# Patient Record
Sex: FEMALE | URBAN - METROPOLITAN AREA
[De-identification: names, ages, dates, MRNs, and addresses within clinical notes are randomized per-mention and may not be internally consistent; named-entity substitution may affect disease eponyms.]

---

## 2024-08-14 ENCOUNTER — APPOINTMENT (OUTPATIENT)
Dept: URBAN - METROPOLITAN AREA SURGERY 22 | Age: 44
Setting detail: DERMATOLOGY
End: 2024-08-14

## 2024-08-14 DIAGNOSIS — Z71.89 OTHER SPECIFIED COUNSELING: ICD-10-CM

## 2024-08-14 DIAGNOSIS — L72.8 OTHER FOLLICULAR CYSTS OF THE SKIN AND SUBCUTANEOUS TISSUE: ICD-10-CM

## 2024-08-14 PROCEDURE — OTHER MIPS QUALITY: OTHER

## 2024-08-14 PROCEDURE — OTHER CONSULTATION EXCISION: OTHER

## 2024-08-14 PROCEDURE — OTHER COUNSELING: OTHER

## 2024-08-14 PROCEDURE — OTHER PRESCRIPTION: OTHER

## 2024-08-14 PROCEDURE — 99202 OFFICE O/P NEW SF 15 MIN: CPT

## 2024-08-14 RX ORDER — DOXYCYCLINE HYCLATE 100 MG/1
CAPSULE, GELATIN COATED ORAL
Qty: 14 | Refills: 0 | Status: ERX | COMMUNITY
Start: 2024-08-14

## 2024-08-14 ASSESSMENT — LOCATION SIMPLE DESCRIPTION DERM: LOCATION SIMPLE: RIGHT EYEBROW

## 2024-08-14 ASSESSMENT — LOCATION ZONE DERM: LOCATION ZONE: FACE

## 2024-08-14 ASSESSMENT — LOCATION DETAILED DESCRIPTION DERM: LOCATION DETAILED: RIGHT CENTRAL EYEBROW

## 2024-09-05 ENCOUNTER — APPOINTMENT (OUTPATIENT)
Dept: URBAN - METROPOLITAN AREA SURGERY 22 | Age: 44
Setting detail: DERMATOLOGY
End: 2024-09-05

## 2024-09-05 DIAGNOSIS — L72.8 OTHER FOLLICULAR CYSTS OF THE SKIN AND SUBCUTANEOUS TISSUE: ICD-10-CM

## 2024-09-05 PROCEDURE — 11440 EXC FACE-MM B9+MARG 0.5 CM/<: CPT

## 2024-09-05 PROCEDURE — OTHER PRESCRIPTION: OTHER

## 2024-09-05 PROCEDURE — OTHER EXCISION: OTHER

## 2024-09-05 PROCEDURE — OTHER COUNSELING: OTHER

## 2024-09-05 RX ORDER — MUPIROCIN 20 MG/G
OINTMENT TOPICAL
Qty: 22 | Refills: 0 | Status: ERX | COMMUNITY
Start: 2024-09-05

## 2024-09-05 ASSESSMENT — LOCATION DETAILED DESCRIPTION DERM: LOCATION DETAILED: RIGHT INFERIOR FOREHEAD

## 2024-09-05 ASSESSMENT — LOCATION ZONE DERM: LOCATION ZONE: FACE

## 2024-09-05 ASSESSMENT — LOCATION SIMPLE DESCRIPTION DERM: LOCATION SIMPLE: RIGHT FOREHEAD

## 2024-09-05 NOTE — PROCEDURE: EXCISION
